# Patient Record
Sex: FEMALE | Race: WHITE
[De-identification: names, ages, dates, MRNs, and addresses within clinical notes are randomized per-mention and may not be internally consistent; named-entity substitution may affect disease eponyms.]

---

## 2018-06-11 ENCOUNTER — HOSPITAL ENCOUNTER (OUTPATIENT)
Dept: HOSPITAL 17 - NEPC | Age: 73
Setting detail: OBSERVATION
LOS: 2 days | Discharge: HOME | End: 2018-06-13
Attending: HOSPITALIST | Admitting: HOSPITALIST
Payer: COMMERCIAL

## 2018-06-11 VITALS
SYSTOLIC BLOOD PRESSURE: 176 MMHG | RESPIRATION RATE: 12 BRPM | HEART RATE: 83 BPM | TEMPERATURE: 98.5 F | OXYGEN SATURATION: 95 % | DIASTOLIC BLOOD PRESSURE: 126 MMHG

## 2018-06-11 VITALS
RESPIRATION RATE: 15 BRPM | SYSTOLIC BLOOD PRESSURE: 186 MMHG | DIASTOLIC BLOOD PRESSURE: 77 MMHG | OXYGEN SATURATION: 94 % | HEART RATE: 85 BPM

## 2018-06-11 VITALS — WEIGHT: 154.32 LBS | BODY MASS INDEX: 28.4 KG/M2 | HEIGHT: 62 IN

## 2018-06-11 DIAGNOSIS — E11.39: ICD-10-CM

## 2018-06-11 DIAGNOSIS — I69.354: ICD-10-CM

## 2018-06-11 DIAGNOSIS — I25.10: ICD-10-CM

## 2018-06-11 DIAGNOSIS — E03.9: ICD-10-CM

## 2018-06-11 DIAGNOSIS — F32.9: ICD-10-CM

## 2018-06-11 DIAGNOSIS — H53.2: ICD-10-CM

## 2018-06-11 DIAGNOSIS — G83.9: ICD-10-CM

## 2018-06-11 DIAGNOSIS — H02.401: ICD-10-CM

## 2018-06-11 DIAGNOSIS — E78.00: ICD-10-CM

## 2018-06-11 DIAGNOSIS — I25.2: ICD-10-CM

## 2018-06-11 DIAGNOSIS — E78.5: ICD-10-CM

## 2018-06-11 DIAGNOSIS — Z79.82: ICD-10-CM

## 2018-06-11 DIAGNOSIS — Z79.4: ICD-10-CM

## 2018-06-11 DIAGNOSIS — H49.00: Primary | ICD-10-CM

## 2018-06-11 DIAGNOSIS — I10: ICD-10-CM

## 2018-06-11 DIAGNOSIS — G51.0: ICD-10-CM

## 2018-06-11 DIAGNOSIS — E11.42: ICD-10-CM

## 2018-06-11 DIAGNOSIS — R53.1: ICD-10-CM

## 2018-06-11 LAB
ALBUMIN SERPL-MCNC: 3.8 GM/DL (ref 3.4–5)
ALP SERPL-CCNC: 173 U/L (ref 45–117)
ALT SERPL-CCNC: 18 U/L (ref 10–53)
AST SERPL-CCNC: 19 U/L (ref 15–37)
BASOPHILS # BLD AUTO: 0 TH/MM3 (ref 0–0.2)
BASOPHILS NFR BLD: 0.3 % (ref 0–2)
BILIRUB SERPL-MCNC: 0.3 MG/DL (ref 0.2–1)
BUN SERPL-MCNC: 11 MG/DL (ref 7–18)
CALCIUM SERPL-MCNC: 9.2 MG/DL (ref 8.5–10.1)
CHLORIDE SERPL-SCNC: 100 MEQ/L (ref 98–107)
COLOR UR: (no result)
CREAT SERPL-MCNC: 0.74 MG/DL (ref 0.5–1)
CRP SERPL-MCNC: (no result) MG/DL (ref 0–0.3)
EOSINOPHIL # BLD: 0.2 TH/MM3 (ref 0–0.4)
EOSINOPHIL NFR BLD: 2.4 % (ref 0–4)
ERYTHROCYTE [DISTWIDTH] IN BLOOD BY AUTOMATED COUNT: 12.9 % (ref 11.6–17.2)
GFR SERPLBLD BASED ON 1.73 SQ M-ARVRAT: 78 ML/MIN (ref 89–?)
GLUCOSE SERPL-MCNC: 167 MG/DL (ref 74–106)
GLUCOSE UR STRIP-MCNC: (no result) MG/DL
HCO3 BLD-SCNC: 33.7 MEQ/L (ref 21–32)
HCT VFR BLD CALC: 45.8 % (ref 35–46)
HGB BLD-MCNC: 15.5 GM/DL (ref 11.6–15.3)
HGB UR QL STRIP: (no result)
INR PPP: 0.9 RATIO
KETONES UR STRIP-MCNC: (no result) MG/DL
LYMPHOCYTES # BLD AUTO: 1.7 TH/MM3 (ref 1–4.8)
LYMPHOCYTES NFR BLD AUTO: 24.2 % (ref 9–44)
MCH RBC QN AUTO: 31.9 PG (ref 27–34)
MCHC RBC AUTO-ENTMCNC: 33.9 % (ref 32–36)
MCV RBC AUTO: 94.2 FL (ref 80–100)
MONOCYTE #: 0.6 TH/MM3 (ref 0–0.9)
MONOCYTES NFR BLD: 7.8 % (ref 0–8)
NEUTROPHILS # BLD AUTO: 4.7 TH/MM3 (ref 1.8–7.7)
NEUTROPHILS NFR BLD AUTO: 65.3 % (ref 16–70)
NITRITE UR QL STRIP: (no result)
PLATELET # BLD: 303 TH/MM3 (ref 150–450)
PMV BLD AUTO: 7.8 FL (ref 7–11)
PROT SERPL-MCNC: 7.6 GM/DL (ref 6.4–8.2)
PROTHROMBIN TIME: 9.3 SEC (ref 9.8–11.6)
RBC # BLD AUTO: 4.86 MIL/MM3 (ref 4–5.3)
SODIUM SERPL-SCNC: 141 MEQ/L (ref 136–145)
SP GR UR STRIP: 1 (ref 1–1.03)
URINE LEUKOCYTE ESTERASE: (no result)
WBC # BLD AUTO: 7.2 TH/MM3 (ref 4–11)

## 2018-06-11 PROCEDURE — 82550 ASSAY OF CK (CPK): CPT

## 2018-06-11 PROCEDURE — 70548 MR ANGIOGRAPHY NECK W/DYE: CPT

## 2018-06-11 PROCEDURE — 96361 HYDRATE IV INFUSION ADD-ON: CPT

## 2018-06-11 PROCEDURE — 96374 THER/PROPH/DIAG INJ IV PUSH: CPT

## 2018-06-11 PROCEDURE — 85610 PROTHROMBIN TIME: CPT

## 2018-06-11 PROCEDURE — 96376 TX/PRO/DX INJ SAME DRUG ADON: CPT

## 2018-06-11 PROCEDURE — P9612 CATHETERIZE FOR URINE SPEC: HCPCS

## 2018-06-11 PROCEDURE — G0378 HOSPITAL OBSERVATION PER HR: HCPCS

## 2018-06-11 PROCEDURE — 99285 EMERGENCY DEPT VISIT HI MDM: CPT

## 2018-06-11 PROCEDURE — 82948 REAGENT STRIP/BLOOD GLUCOSE: CPT

## 2018-06-11 PROCEDURE — 85730 THROMBOPLASTIN TIME PARTIAL: CPT

## 2018-06-11 PROCEDURE — 81001 URINALYSIS AUTO W/SCOPE: CPT

## 2018-06-11 PROCEDURE — 93005 ELECTROCARDIOGRAM TRACING: CPT

## 2018-06-11 PROCEDURE — 70544 MR ANGIOGRAPHY HEAD W/O DYE: CPT

## 2018-06-11 PROCEDURE — 70551 MRI BRAIN STEM W/O DYE: CPT

## 2018-06-11 PROCEDURE — 80048 BASIC METABOLIC PNL TOTAL CA: CPT

## 2018-06-11 PROCEDURE — 80053 COMPREHEN METABOLIC PANEL: CPT

## 2018-06-11 PROCEDURE — 83036 HEMOGLOBIN GLYCOSYLATED A1C: CPT

## 2018-06-11 PROCEDURE — A9579 GAD-BASE MR CONTRAST NOS,1ML: HCPCS

## 2018-06-11 PROCEDURE — 86140 C-REACTIVE PROTEIN: CPT

## 2018-06-11 PROCEDURE — 82607 VITAMIN B-12: CPT

## 2018-06-11 PROCEDURE — 85025 COMPLETE CBC W/AUTO DIFF WBC: CPT

## 2018-06-11 PROCEDURE — 84443 ASSAY THYROID STIM HORMONE: CPT

## 2018-06-11 PROCEDURE — 97162 PT EVAL MOD COMPLEX 30 MIN: CPT

## 2018-06-11 PROCEDURE — 85652 RBC SED RATE AUTOMATED: CPT

## 2018-06-11 PROCEDURE — 96372 THER/PROPH/DIAG INJ SC/IM: CPT

## 2018-06-11 PROCEDURE — 97166 OT EVAL MOD COMPLEX 45 MIN: CPT

## 2018-06-11 NOTE — EKG
Date Performed: 06/11/2018       Time Performed: 16:48:37

 

PTAGE:      72 years

 

EKG:      Sinus rhythm 

 

 NORMAL ECG Compared to prior electrocardiogram, Premature ventricular contractions no longer present
. . 

 

 PREVIOUS TRACING            : 01/18/2001 10.40

 

DOCTOR:   Jorge Luis Telles  Interpretating Date/Time  06/11/2018 21:45:20

## 2018-06-11 NOTE — RADRPT
EXAM DATE:  6/11/2018 7:48 PM EDT

AGE/SEX:        72 years / Female



INDICATIONS:  Stroke.



CLINICAL DATA:  This is the patient's initial encounter. Patient reports that signs and symptoms have
 been present for 1 day and indicates a pain score of 2/10. 

                                                                          

MEDICAL/SURGICAL HISTORY:       Diabetes mellitus type II. Appendectomy.  Hysterectomy. Bilateral kne
e sx.



[place]      [facility] [place]2018.  . 





TECHNIQUE:     3D time-of-flight MRA was performed.  Source images, multiplanar STS MIP, and 3D volum
e MIP reconstructions were reviewed.



FINDINGS:     

There is excellent visualization of the major intracranial arteries out to the second-order branch ve
ssels.  There is no evidence for aneurysm, vessel truncation or stenosis, and no evidence for vascula
r malformation.



CONCLUSION: 

1.  Negative MRA Cow ([place]) non contrast.





Electronically signed by: DISHA Sands MD  6/11/2018 10:22 PM EDT

## 2018-06-11 NOTE — RADRPT
EXAM DATE:  6/11/2018 7:52 PM EDT

AGE/SEX:        72 years / Female



INDICATIONS:  Stroke. Right sided facial droop.



CLINICAL DATA:  This is the patient's initial encounter. Patient reports that signs and symptoms have
 been present for 1 week and indicates a pain score of 4/10. 

                                                                          

MEDICAL/SURGICAL HISTORY:       Diabetes mellitus type II.  Hypertension. Appendectomy.  Hysterectomy
. Bilat knees.



COMPARISON:      No prior exams available for comparison. 





TECHNIQUE:   20 ml Omniscan (gadodiamide) contrast infused MRA (single exam dose) of the extracranial
 circulation was performed using a neurovascular coil.  Postprocessing was performed, including rotat
ing sub-volume maximum intensity projections of each carotid artery, rotating full-volume maximum int
ensity projections of both carotid arteries, sagittal and coronal sliding thin-slab reformations of e
ach carotid artery, and left oblique sliding thin-slab reformation through the aortic arch to include
 the origin of the arch branch vessels.







FINDINGS:  

The examination is slightly limited due to motion artifact particularly evaluating aortic arch, howev
er major vessels appear grossly patent. The vertebral arteries are intact and there is no evidence fo
r any significant stenosis involving the internal carotid arteries on either side.



CONCLUSION: 

1.  Unremarkable study.



_____________________________________________________________________________

Percent stenosis is calculated using the diameter of the stenotic region over the diameter of the nor
mal distal internal carotid artery

_____________________________________________________________________________





Electronically signed by: DISHA Sands MD  6/11/2018 8:11 PM EDT

## 2018-06-11 NOTE — PD
HPI


Chief Complaint:  Neuro Symptoms/ Deficits


Time Seen by Provider:  17:25


Travel History


International Travel<30 days:  No


Contact w/Intl Traveler<30days:  No


Traveled to known affect area:  No





History of Present Illness


HPI


72y female with a history of CVA with residual left-sided weakness, diabetes 

mellitus, hypertension, presents emergency department at the request of her 

primary care physician for evaluation of right sided facial drooping and pain 

that started 4 days ago.  Patient was evaluated yesterday and [facility] 

ER where she had a CT performed and discharged home to follow-up with a primary 

care physician. She saw the ophthalmologist yesterday and they recommended she 

come to the ER for evaluation. Patient says she takes a daily aspirin but 

denies any other blood thinner use.  Patient previously was evaluated by 

neurology several years ago but has not followed up since then.  She says that 

she has had multiple CVAs affecting her left side but never on the right.  Says 

that she is having double vision associated with the weakness.  She denies 

chest pain or shortness of breath.  She denies abdominal pain. She denies 

unusual muscular weakness.





PFSH


Past Medical History


Depression:  Yes


High Cholesterol:  Yes


Cerebrovascular Accident:  Yes (L SIDE EFFECTED RESIDUAL)


Diabetes:  Yes


Patient Takes Glucophage:  No


Headaches:  Yes


Hypertension:  Yes


Myocardial Infarction:  Yes (multiple MI, stents placed)


Thyroid Disease:  Yes (HYPER)





Past Surgical History


Appendectomy:  Yes


Hysterectomy:  Yes


Tonsillectomy:  Yes





Social History


Alcohol Use:  No


Tobacco Use:  No


Substance Use:  No





Allergies-Medications


(Allergen,Severity, Reaction):  


Coded Allergies:  


     codeine (Unverified  Allergy, Severe, RASH, 6/11/18)


Reported Meds & Prescriptions





Reported Meds & Active Scripts


Active


Reported


Lasix (Furosemide) 40 Mg Tab 40 Mg PO DAILY


Levothyroxine (Levothyroxine Sodium) 137 Mcg Tab 137 Mcg PO DAILY


Baclofen 20 Mg Tab 20 Mg PO TID


Aspirin 325 Mg Tab 325 Mg PO DAILY


Gabapentin 300 Mg Cap 300 Mg PO TID


Metoprolol Tartrate 25 Mg Tab 25 Mg PO BID


Omeprazole 20 Mg Tab 20 Mg PO DAILY


Novolin R Inj (Insulin Human Regular) 1,000 Unit/10 Ml Vial 0 SQ AS DIRECTED


     Sliding Scale As Directed.


Dyrenium (Triamterene) 50 Mg Cap 50 Mg PO DAILY


Hydrochlorothiazide 50 Mg Tab 75 Mg PO DAILY


Simvastatin 40 Mg Tab 40 Mg PO HS


Klor-Con 10 (Potassium Chloride) 10 Meq Tab 10 Meq PO BID


Effexor (Venlafaxine HCl) 75 Mg Tab 150 Mg PO Q12H


Levemir Flextouch Pen Inj (Insulin Detemir) 300 unit/3 ML Pen 60 Units SQ BID








Review of Systems


Except as stated in HPI:  all other systems reviewed are Neg





Physical Exam


Narrative


GENERAL: WD, WN in NAD


SKIN: Focused skin assessment warm/dry.


HEAD: Atraumatic. Normocephalic. 


EYES: Pupils equal and round. No scleral icterus. No injection or drainage. 


ENT: No nasal bleeding or discharge.  Mucous membranes pink and moist.


NECK: Trachea midline. No JVD. 


CARDIOVASCULAR: Regular rate and rhythm.  No murmur appreciated.


RESPIRATORY: No accessory muscle use. Clear to auscultation. Breath sounds 

equal bilaterally. 


GASTROINTESTINAL: Abdomen soft, non-tender, nondistended. Hepatic and splenic 

margins not palpable. 


MUSCULOSKELETAL: No obvious deformities. No clubbing.  No cyanosis.  No edema. 


NEUROLOGICAL: Awake and alert. 


Ptosis with lateral lower gaze of R eye, right sided drooping of mouth, 


Able to raise eyebrows bilaterally and close eyes with resistance.


L sided pronator drift (chronic, per patient), Grade 5/5 upper and lower 

extremities.


PSYCHIATRIC: Appropriate mood and affect; insight and judgment normal.





Data


Data


Last Documented VS





Vital Signs








  Date Time  Temp Pulse Resp B/P (MAP) Pulse Ox O2 Delivery O2 Flow Rate FiO2


 


6/11/18 18:10  85 15 186/77 (113) 94 Room Air  


 


6/11/18 17:21 98.5       








Orders





 Orders


Mri Brain W/O Contrast (6/11/18 )


Mra Brain W/O Contrast (Cow) (6/11/18 )


Mra Carotids W Contrast (6/11/18 )


Electrocardiogram (6/11/18 )


Prothrombin Time / Inr (Pt) (6/11/18 17:25)


Act Partial Throm Time (Ptt) (6/11/18 17:25)


Complete Blood Count With Diff (6/11/18 17:25)


Ua Includes Microscopic (6/11/18 17:25)


Blood Glucose (6/11/18 17:25)


Ecg Monitoring (6/11/18 17:25)


Iv Access Insert/Monitor (6/11/18 17:25)


NPO (6/11/18 17:25)


Oximetry (6/11/18 17:25)


Sodium Chlor 0.9% 1000 Ml Inj (Ns 1000 M (6/11/18 17:25)


Cath For Specimen (6/11/18 17:25)


^ Straight Catheter (6/11/18 17:29)


Comprehensive Metabolic Panel (6/11/18 17:30)


W[place]gren Sedimentation Rate (6/11/18 17:30)


C-Reactive Protein (Crp) (6/11/18 17:30)


Creatine Kinase (Cpk) (6/11/18 17:30)


Gadodiamide Pf Inj (Omniscan Pf Inj) (6/11/18 17:00)


Acetaminophen (Tylenol) (6/11/18 23:15)


Admit Order (Ed Use Only) (6/11/18 23:15)





Labs





Laboratory Tests








Test


  6/11/18


17:37 6/11/18


17:40


 


White Blood Count 7.2 TH/MM3  


 


Red Blood Count 4.86 MIL/MM3  


 


Hemoglobin 15.5 GM/DL  


 


Hematocrit 45.8 %  


 


Mean Corpuscular Volume 94.2 FL  


 


Mean Corpuscular Hemoglobin 31.9 PG  


 


Mean Corpuscular Hemoglobin


Concent 33.9 % 


  


 


 


Red Cell Distribution Width 12.9 %  


 


Platelet Count 303 TH/MM3  


 


Mean Platelet Volume 7.8 FL  


 


Neutrophils (%) (Auto) 65.3 %  


 


Lymphocytes (%) (Auto) 24.2 %  


 


Monocytes (%) (Auto) 7.8 %  


 


Eosinophils (%) (Auto) 2.4 %  


 


Basophils (%) (Auto) 0.3 %  


 


Neutrophils # (Auto) 4.7 TH/MM3  


 


Lymphocytes # (Auto) 1.7 TH/MM3  


 


Monocytes # (Auto) 0.6 TH/MM3  


 


Eosinophils # (Auto) 0.2 TH/MM3  


 


Basophils # (Auto) 0.0 TH/MM3  


 


CBC Comment DIFF FINAL  


 


Differential Comment   


 


Erythrocyte Sedimentation Rate 17 mm/hr  


 


Prothrombin Time 9.3 SEC  


 


Prothromb Time International


Ratio 0.9 RATIO 


  


 


 


Activated Partial


Thromboplast Time 22.1 SEC 


  


 


 


Blood Urea Nitrogen 11 MG/DL  


 


Creatinine 0.74 MG/DL  


 


Random Glucose 167 MG/DL  


 


Total Protein 7.6 GM/DL  


 


Albumin 3.8 GM/DL  


 


Calcium Level 9.2 MG/DL  


 


Alkaline Phosphatase 173 U/L  


 


Aspartate Amino Transf


(AST/SGOT) 19 U/L 


  


 


 


Alanine Aminotransferase


(ALT/SGPT) 18 U/L 


  


 


 


Total Bilirubin 0.3 MG/DL  


 


Sodium Level 141 MEQ/L  


 


Potassium Level 4.0 MEQ/L  


 


Chloride Level 100 MEQ/L  


 


Carbon Dioxide Level 33.7 MEQ/L  


 


Anion Gap 7 MEQ/L  


 


Estimat Glomerular Filtration


Rate 78 ML/MIN 


  


 


 


Total Creatine Kinase 85 U/L  


 


C-Reactive Protein


  LESS THAN 0.29


MG/DL 


 


 


Urine Color  LIGHT-YELLOW 


 


Urine Turbidity  CLEAR 


 


Urine pH  7.0 


 


Urine Specific Gravity  1.003 


 


Urine Protein  NEG mg/dL 


 


Urine Glucose (UA)  NEG mg/dL 


 


Urine Ketones  NEG mg/dL 


 


Urine Occult Blood  NEG 


 


Urine Nitrite  NEG 


 


Urine Bilirubin  NEG 


 


Urine Urobilinogen


  


  LESS THAN 2.0


MG/DL


 


Urine Leukocyte Esterase  NEG 


 


Urine WBC


  


  LESS THAN 1


/hpf


 


Microscopic Urinalysis Comment  CATH 











MDM


Medical Decision Making


Medical Screen Exam Complete:  Yes


Emergency Medical Condition:  Yes


Differential Diagnosis


TIA, CVA, ICH, nerve palsy, diplopia


Narrative Course


72-year-old female with history of multiple CVAs with residual left-sided 

weakness presents emergency department for evaluation of right facial drooping 

that started approximately 4 days ago.  Patient was seen at [facility], CT 

was performed and found to be without acute process.  She was advised to follow-

up an ophthalmologist and she saw an ophthalmologist yesterday evening who 

recommended she come back to the emergency department for further evaluation 

and workup.


Vital signs are stable.


Labs and imaging studies ordered.


I consulted my attending regarding this case. He recommended MRI, MRA brain and 

carotids.





After review the EMR, it appears that patient went to [facility] [place] and 

emergency department for evaluation of her symptoms.  There is no acute process 

of the CT brain or maxillofacial imaging studies.  There was a suspicion of 

some sort of nerve palsy causing her symptoms.  Her inflammatory markers were 

negative at that time.





CBC & BMP Diagram


6/11/18 17:37








Total Protein 7.6, Albumin 3.8, Calcium Level 9.2, Alkaline Phosphatase 173 H, 

Aspartate Amino Transf (AST/SGOT) 19, Alanine Aminotransferase (ALT/SGPT) 18, 

Total Bilirubin 0.3


CK 85, CRP 0.29, ESR 17, urine clear.





It does not appear that she has had a neuro work up since the onset of these 

symptoms. My attending recommended admission for further evaluation because of 

her worsening symptoms. 


Pt will be admitted for neuro consult.





Diagnosis





 Primary Impression:  


 Facial palsy





Admitting Information


Admitting Physician Requests:  Observation


Condition:  Stable











Harmony Stewart Jun 11, 2018 17:37

## 2018-06-11 NOTE — RADRPT
EXAM DATE:  6/11/2018 6:56 PM EDT

AGE/SEX:        72 years / Female



INDICATIONS:    CVA.  Right sided facial droop.



CLINICAL DATA:  This is the patient's initial encounter. Patient reports that signs and symptoms have
 been present for 1 week and indicates a pain score of 4/10. 

                                                                          

MEDICAL/SURGICAL HISTORY:       Diabetes mellitus type II.  Hypertension. Appendectomy.  Hysterectomy
. Bilat knees.



COMPARISON:      [facility], CT BRAIN W/O CONTRAST, 6/10/2018.  . 





TECHNIQUE: Multiplanar, multisequence examination of the brain was performed without contrast.



FINDINGS:  

There is no evidence for intracranial hemorrhage, mass effect, mass lesions, edema, or extra-axial fl
uid collections.  The ventricles are normal size for the patient's age.  There are no signs of acute 
infarction for technique.  The diffusion portion is unremarkable. 



CONCLUSION:  Unremarkable study.  



Electronically signed by: DISHA Sands MD  6/11/2018 6:59 PM EDT

## 2018-06-12 VITALS
TEMPERATURE: 98 F | HEART RATE: 65 BPM | OXYGEN SATURATION: 94 % | DIASTOLIC BLOOD PRESSURE: 62 MMHG | RESPIRATION RATE: 18 BRPM | SYSTOLIC BLOOD PRESSURE: 137 MMHG

## 2018-06-12 VITALS
OXYGEN SATURATION: 98 % | SYSTOLIC BLOOD PRESSURE: 136 MMHG | RESPIRATION RATE: 16 BRPM | HEART RATE: 75 BPM | DIASTOLIC BLOOD PRESSURE: 60 MMHG

## 2018-06-12 VITALS
TEMPERATURE: 98.7 F | RESPIRATION RATE: 16 BRPM | HEART RATE: 82 BPM | SYSTOLIC BLOOD PRESSURE: 146 MMHG | DIASTOLIC BLOOD PRESSURE: 65 MMHG | OXYGEN SATURATION: 92 %

## 2018-06-12 VITALS
HEART RATE: 69 BPM | DIASTOLIC BLOOD PRESSURE: 80 MMHG | SYSTOLIC BLOOD PRESSURE: 118 MMHG | OXYGEN SATURATION: 96 % | RESPIRATION RATE: 18 BRPM | TEMPERATURE: 98 F

## 2018-06-12 VITALS
DIASTOLIC BLOOD PRESSURE: 68 MMHG | TEMPERATURE: 97.9 F | OXYGEN SATURATION: 96 % | RESPIRATION RATE: 17 BRPM | HEART RATE: 72 BPM | SYSTOLIC BLOOD PRESSURE: 146 MMHG

## 2018-06-12 VITALS
RESPIRATION RATE: 16 BRPM | OXYGEN SATURATION: 98 % | HEART RATE: 77 BPM | TEMPERATURE: 98.6 F | SYSTOLIC BLOOD PRESSURE: 150 MMHG | DIASTOLIC BLOOD PRESSURE: 65 MMHG

## 2018-06-12 VITALS
TEMPERATURE: 98.5 F | HEART RATE: 72 BPM | DIASTOLIC BLOOD PRESSURE: 63 MMHG | OXYGEN SATURATION: 94 % | RESPIRATION RATE: 15 BRPM | SYSTOLIC BLOOD PRESSURE: 126 MMHG

## 2018-06-12 VITALS
OXYGEN SATURATION: 96 % | DIASTOLIC BLOOD PRESSURE: 67 MMHG | TEMPERATURE: 98.2 F | RESPIRATION RATE: 20 BRPM | SYSTOLIC BLOOD PRESSURE: 154 MMHG | HEART RATE: 69 BPM

## 2018-06-12 VITALS — HEART RATE: 80 BPM

## 2018-06-12 VITALS — HEART RATE: 64 BPM

## 2018-06-12 VITALS — HEART RATE: 70 BPM

## 2018-06-12 VITALS — HEART RATE: 73 BPM

## 2018-06-12 LAB
BASOPHILS # BLD AUTO: 0 TH/MM3 (ref 0–0.2)
BASOPHILS NFR BLD: 0.4 % (ref 0–2)
BUN SERPL-MCNC: 9 MG/DL (ref 7–18)
CALCIUM SERPL-MCNC: 8.7 MG/DL (ref 8.5–10.1)
CHLORIDE SERPL-SCNC: 101 MEQ/L (ref 98–107)
CREAT SERPL-MCNC: 0.58 MG/DL (ref 0.5–1)
CRP SERPL-MCNC: (no result) MG/DL (ref 0–0.3)
EOSINOPHIL # BLD: 0.3 TH/MM3 (ref 0–0.4)
EOSINOPHIL NFR BLD: 5.6 % (ref 0–4)
ERYTHROCYTE [DISTWIDTH] IN BLOOD BY AUTOMATED COUNT: 12.9 % (ref 11.6–17.2)
GFR SERPLBLD BASED ON 1.73 SQ M-ARVRAT: 103 ML/MIN (ref 89–?)
GLUCOSE SERPL-MCNC: 207 MG/DL (ref 74–106)
HBA1C MFR BLD: 11.8 % (ref 4.3–6)
HCO3 BLD-SCNC: 32.1 MEQ/L (ref 21–32)
HCT VFR BLD CALC: 40 % (ref 35–46)
HGB BLD-MCNC: 13.6 GM/DL (ref 11.6–15.3)
LYMPHOCYTES # BLD AUTO: 2.4 TH/MM3 (ref 1–4.8)
LYMPHOCYTES NFR BLD AUTO: 43.6 % (ref 9–44)
MCH RBC QN AUTO: 31.7 PG (ref 27–34)
MCHC RBC AUTO-ENTMCNC: 33.9 % (ref 32–36)
MCV RBC AUTO: 93.5 FL (ref 80–100)
MONOCYTE #: 0.5 TH/MM3 (ref 0–0.9)
MONOCYTES NFR BLD: 8.3 % (ref 0–8)
NEUTROPHILS # BLD AUTO: 2.3 TH/MM3 (ref 1.8–7.7)
NEUTROPHILS NFR BLD AUTO: 42.1 % (ref 16–70)
PLATELET # BLD: 247 TH/MM3 (ref 150–450)
PMV BLD AUTO: 7.7 FL (ref 7–11)
RBC # BLD AUTO: 4.28 MIL/MM3 (ref 4–5.3)
SODIUM SERPL-SCNC: 141 MEQ/L (ref 136–145)
VIT B12 SERPL-MCNC: 383 PG/ML (ref 193–986)
WBC # BLD AUTO: 5.5 TH/MM3 (ref 4–11)

## 2018-06-12 RX ADMIN — FUROSEMIDE SCH MG: 40 TABLET ORAL at 08:34

## 2018-06-12 RX ADMIN — LEVOTHYROXINE SODIUM SCH MCG: 112 TABLET ORAL at 06:28

## 2018-06-12 RX ADMIN — HYDROCODONE BITARTRATE AND ACETAMINOPHEN PRN TAB: 10; 325 TABLET ORAL at 22:19

## 2018-06-12 RX ADMIN — PANTOPRAZOLE SODIUM SCH MG: 20 TABLET, DELAYED RELEASE ORAL at 08:36

## 2018-06-12 RX ADMIN — INSULIN ASPART SCH: 100 INJECTION, SOLUTION INTRAVENOUS; SUBCUTANEOUS at 13:25

## 2018-06-12 RX ADMIN — HYDROCHLOROTHIAZIDE SCH MG: 50 TABLET ORAL at 08:34

## 2018-06-12 RX ADMIN — METOPROLOL TARTRATE SCH MG: 25 TABLET, FILM COATED ORAL at 08:36

## 2018-06-12 RX ADMIN — GABAPENTIN SCH MG: 300 CAPSULE ORAL at 08:36

## 2018-06-12 RX ADMIN — METOPROLOL TARTRATE SCH MG: 25 TABLET, FILM COATED ORAL at 21:00

## 2018-06-12 RX ADMIN — GABAPENTIN SCH MG: 300 CAPSULE ORAL at 18:29

## 2018-06-12 RX ADMIN — INSULIN ASPART SCH: 100 INJECTION, SOLUTION INTRAVENOUS; SUBCUTANEOUS at 22:21

## 2018-06-12 RX ADMIN — HYDROCODONE BITARTRATE AND ACETAMINOPHEN PRN TAB: 10; 325 TABLET ORAL at 16:34

## 2018-06-12 RX ADMIN — ENOXAPARIN SODIUM SCH MG: 40 INJECTION SUBCUTANEOUS at 00:16

## 2018-06-12 RX ADMIN — LEVOTHYROXINE SODIUM SCH MCG: 25 TABLET ORAL at 06:28

## 2018-06-12 RX ADMIN — ASPIRIN SCH MG: 325 TABLET ORAL at 08:34

## 2018-06-12 RX ADMIN — ACYCLOVIR SCH UNITS: 800 TABLET ORAL at 22:20

## 2018-06-12 RX ADMIN — Medication SCH ML: at 08:37

## 2018-06-12 RX ADMIN — ACYCLOVIR SCH UNITS: 800 TABLET ORAL at 11:07

## 2018-06-12 RX ADMIN — Medication SCH ML: at 22:17

## 2018-06-12 RX ADMIN — INSULIN ASPART SCH: 100 INJECTION, SOLUTION INTRAVENOUS; SUBCUTANEOUS at 18:29

## 2018-06-12 RX ADMIN — INSULIN ASPART SCH: 100 INJECTION, SOLUTION INTRAVENOUS; SUBCUTANEOUS at 11:06

## 2018-06-12 RX ADMIN — VENLAFAXINE HYDROCHLORIDE SCH MG: 75 CAPSULE, EXTENDED RELEASE ORAL at 08:34

## 2018-06-12 RX ADMIN — GABAPENTIN SCH MG: 300 CAPSULE ORAL at 13:25

## 2018-06-12 NOTE — HHI.HP
__________________________________________________





HPI


Service


[facility]


Primary Care Physician


Salvador Manning MD


Admission Diagnosis





R facial drooping x 4 days, worsening last night, r/o CVA


Diagnoses:  


Travel History


International Travel<30 Days:  No


Contact w/Intl Traveler <30 Da:  No


Traveled to Known Affected Are:  No


History of Present Illness


72-year-old female with a past medical history significant for diabetes mellitus

, coronary artery disease, Graves' disease, hypertension, hyperlipidemia, 

neuropathy and history of CVA 5 presents the emergency department for 

evaluation of double vision and right sided ptosis.  The patient reports that 

for the past 4 days she has had double vision.  She states for the past 2 days 

she has had right eyelid drooping with right-sided facial paralysis.  The 

patient was seen in [place] yesterday where she was diagnosed with a facial 

palsy.  Patient denies any chest pain or shortness of breath.  No abdominal 

pain.  No nausea/vomiting/diarrhea.  No fevers/chills.





Review of Systems


Except as stated in HPI:  all other systems reviewed are Neg





Past Family Social History


Past Medical History


diabetes mellitus, coronary artery disease, Graves' disease, hypertension, 

hyperlipidemia, neuropathy and history of CVA 5


Past Surgical History


Cardiac catheterization with stent placement 1


Hysterectomy


Back tumor removal


Appendectomy


Tonsillectomy


Bilateral knee arthroscopically


Right rotator cuff repair


Reported Medications





Reported Meds & Active Scripts


Active


Reported


Lasix (Furosemide) 40 Mg Tab 40 Mg PO DAILY


Levothyroxine (Levothyroxine Sodium) 137 Mcg Tab 137 Mcg PO DAILY


Baclofen 20 Mg Tab 20 Mg PO TID


Aspirin 325 Mg Tab 325 Mg PO DAILY


Gabapentin 300 Mg Cap 300 Mg PO TID


Metoprolol Tartrate 25 Mg Tab 25 Mg PO BID


Omeprazole 20 Mg Tab 20 Mg PO DAILY


Novolin R Inj (Insulin Human Regular) 1,000 Unit/10 Ml Vial 0 SQ AS DIRECTED


     Sliding Scale As Directed.


Dyrenium (Triamterene) 50 Mg Cap 50 Mg PO DAILY


Hydrochlorothiazide 50 Mg Tab 75 Mg PO DAILY


Simvastatin 40 Mg Tab 40 Mg PO HS


Klor-Con 10 (Potassium Chloride) 10 Meq Tab 10 Meq PO BID


Effexor (Venlafaxine HCl) 75 Mg Tab 150 Mg PO Q12H


Levemir Flextouch Pen Inj (Insulin Detemir) 300 unit/3 ML Pen 60 Units SQ BID


Allergies:  


Coded Allergies:  


     codeine (Unverified  Allergy, Severe, RASH, 18)


Family History


Mother with diabetes mellitus.  Father with coronary artery disease.


Social History


Negative for alcohol, tobacco and illicit drugs





Physical Exam


Vital Signs





Vital Signs








  Date Time  Temp Pulse Resp B/P (MAP) Pulse Ox O2 Delivery O2 Flow Rate FiO2


 


18 18:10  85 15 186/77 (113) 94 Room Air  


 


18 18:03     (143)  Room Air  


 


18 17:21 98.5 83 12 176/126 (143) 95 Room Air  








Physical Exam


GENERAL:  female sitting up in bed


SKIN: No rashes, ecchymoses or lesions. Cool and dry.


HEAD: Atraumatic. Normocephalic. No temporal or scalp tenderness.


EYES: Pupils equal round and reactive. Extraocular motions intact. No scleral 

icterus. No injection or drainage. 


ENT: Nose without bleeding, purulent drainage or septal hematoma. Throat 

without erythema, tonsillar hypertrophy or exudate. Uvula midline. Airway 

patent.


NECK: Trachea midline. No JVD or lymphadenopathy. Supple, nontender, no 

meningeal signs.


CARDIOVASCULAR: Regular rate and rhythm without murmurs, gallops, or rubs. 


RESPIRATORY: Clear to auscultation. Breath sounds equal bilaterally. No wheezes

, rales, or rhonchi.  


GASTROINTESTINAL: Abdomen soft, non-tender, nondistended. No hepato-splenomegaly

, or palpable masses. No guarding.


MUSCULOSKELETAL: Extremities without clubbing, cyanosis, or edema. No joint 

tenderness, effusion, or edema noted. No calf tenderness. 


NEUROLOGICAL: Awake and alert.  Right-sided ptosis with inability to raise the 

right eyebrow.  Remainder of cranial nerve exam within normal limits.


Laboratory





Laboratory Tests








Test


  18


17:37 18


17:40


 


White Blood Count 7.2  


 


Red Blood Count 4.86  


 


Hemoglobin 15.5  


 


Hematocrit 45.8  


 


Mean Corpuscular Volume 94.2  


 


Mean Corpuscular Hemoglobin 31.9  


 


Mean Corpuscular Hemoglobin


Concent 33.9 


  


 


 


Red Cell Distribution Width 12.9  


 


Platelet Count 303  


 


Mean Platelet Volume 7.8  


 


Neutrophils (%) (Auto) 65.3  


 


Lymphocytes (%) (Auto) 24.2  


 


Monocytes (%) (Auto) 7.8  


 


Eosinophils (%) (Auto) 2.4  


 


Basophils (%) (Auto) 0.3  


 


Neutrophils # (Auto) 4.7  


 


Lymphocytes # (Auto) 1.7  


 


Monocytes # (Auto) 0.6  


 


Eosinophils # (Auto) 0.2  


 


Basophils # (Auto) 0.0  


 


CBC Comment DIFF FINAL  


 


Differential Comment   


 


Erythrocyte Sedimentation Rate 17  


 


Prothrombin Time 9.3  


 


Prothromb Time International


Ratio 0.9 


  


 


 


Activated Partial


Thromboplast Time 22.1 


  


 


 


Blood Urea Nitrogen 11  


 


Creatinine 0.74  


 


Random Glucose 167  


 


Total Protein 7.6  


 


Albumin 3.8  


 


Calcium Level 9.2  


 


Alkaline Phosphatase 173  


 


Aspartate Amino Transf


(AST/SGOT) 19 


  


 


 


Alanine Aminotransferase


(ALT/SGPT) 18 


  


 


 


Total Bilirubin 0.3  


 


Sodium Level 141  


 


Potassium Level 4.0  


 


Chloride Level 100  


 


Carbon Dioxide Level 33.7  


 


Anion Gap 7  


 


Estimat Glomerular Filtration


Rate 78 


  


 


 


Total Creatine Kinase 85  


 


C-Reactive Protein LESS THAN 0.29  


 


Urine Color  LIGHT-YELLOW 


 


Urine Turbidity  CLEAR 


 


Urine pH  7.0 


 


Urine Specific Gravity  1.003 


 


Urine Protein  NEG 


 


Urine Glucose (UA)  NEG 


 


Urine Ketones  NEG 


 


Urine Occult Blood  NEG 


 


Urine Nitrite  NEG 


 


Urine Bilirubin  NEG 


 


Urine Urobilinogen  LESS THAN 2.0 


 


Urine Leukocyte Esterase  NEG 


 


Urine WBC  LESS THAN 1 


 


Microscopic Urinalysis Comment  CATH 








Result Diagram:  


18 173








Caprini VTE Risk Assessment


Caprini VTE Risk Assessment:  Mod/High Risk (score >= 2)


Caprini Risk Assessment Model











 Point Value = 1          Point Value = 2  Point Value = 3  Point Value = 5


 


Age 41-60


Minor surgery


BMI > 25 kg/m2


Swollen legs


Varicose veins


Pregnancy or postpartum


History of unexplained or recurrent


   spontaneous 


Oral contraceptives or hormone


   replacement


Sepsis (< 1 month)


Serious lung disease, including


   pneumonia (< 1 month)


Abnormal pulmonary function


Acute myocardial infarction


Congestive heart failure (< 1 month)


History of inflammatory bowel disease


Medical patient at bed rest Age 61-74


Arthroscopic surgery


Major open surgery (> 45 min)


Laparoscopic surgery (> 45 min)


Malignancy


Confined to bed (> 72 hours)


Immobilizing plaster cast


Central venous access Age >= 75


History of VTE


Family history of VTE


Factor V Leiden


Prothrombin 52411C


Lupus anticoagulant


Anticardiolipin antibodies


Elevated serum homocysteine


Heparin-induced thrombocytopenia


Other congenital or acquired


   thrombophilia Stroke (< 1 month)


Elective arthroplasty


Hip, pelvis, or leg fracture


Acute spinal cord injury (< 1 month)








Prophylaxis Regimen











   Total Risk


Factor Score Risk Level Prophylaxis Regimen


 


0-1      Low Early ambulation


 


2 Moderate Order ONE of the following:


*Sequential Compression Device (SCD)


*Heparin 5000 units SQ BID


 


3-4 Higher Order ONE of the following medications:


*Heparin 5000 units SQ TID


*Enoxaparin/Lovenox 40 mg SQ daily (WT < 150 kg, CrCl > 30 mL/min)


*Enoxaparin/Lovenox 30 mg SQ daily (WT < 150 kg, CrCl > 10-29 mL/min)


*Enoxaparin/Lovenox 30 mg SQ BID (WT < 150 kg, CrCl > 30 mL/min)


AND/OR


*Sequential Compression Device (SCD)


 


5 or more Highest Order ONE of the following medications:


*Heparin 5000 units SQ TID (Preferred with Epidurals)


*Enoxaparin/Lovenox 40 mg SQ daily (WT < 150 kg, CrCl > 30 mL/min)


*Enoxaparin/Lovenox 30 mg SQ daily (WT < 150 kg, CrCl > 10-29 mL/min)


*Enoxaparin/Lovenox 30 mg SQ BID (WT < 150 kg, CrCl > 30 mL/min)


AND


*Sequential Compression Device (SCD)











Assessment and Plan


Assessment and Plan


Assessment/plan:





1.  Double vision/ptosis


Unclear etiology


Symptoms and imaging consistent with Bell's palsy


Patient reports seeing the eye doctor today who states that "something is wrong 

with her brain."


Brain MRI/MRA and neck MRA within normal limits


Neurology consulted, appreciate recommendations





2.  Diabetes mellitus


Continue home Levemir


Sliding-scale insulin


Monitor blood glucose





3.  Hypertension/hyperlipidemia/CAD


Continue home medications





4.  Hypothyroidism


Continue home Synthroid





5.  Neuropathy


Continue home gabapentin





FEN


N.p.o.


Electrolytes: Monitor and replete as needed


NS at 70 cc/hour


Clary Stafford MD 2018 01:30

## 2018-06-12 NOTE — PD.CONS
History of Present Illness


Service


Neurology


Consult Requested By


Medical, TIA


Primary Care Physician


Salvador Manning MD


History of Present Illness


72-year-old female  presents the emergency department for evaluation of double 

vision and right sided ptosis.  The patient reports that for the past 4 days 

she has had double vision.  Horizontal diplopia she states for the past 2 days 

she has had right eyelid drooping with right-sided facial paralysis.


Daughter states patient's been noncompliant diabetic medications her blood 

sugars have been running high and has been under moderate level of stress.


Patient complains of right-sided headache.  Her ESR CRP are normal.


Denies any fever or chills.  Vitals have been stable since arrival no 

leukocytosis no fever.


MRI brain no acute lesion no stroke.  MRA [place] [facility] [place] and carotids normal 

no significant vaso-occlusive disease no dissection.








Review of Systems


Except as stated in HPI:  all other systems reviewed are Neg





Past Family Social History


Past Medical History


diabetes mellitus, coronary artery disease, Graves' disease, hypertension, 

hyperlipidemia, neuropathy





Past Surgical History


Coronary artery disease


Hysterectomy


Back tumor removal


Appendectomy


Tonsillectomy


Bilateral knee arthroscopically


Right rotator cuff repair


Reported Medications





Reported Meds & Active Scripts


Active


Reported


Lasix (Furosemide) 40 Mg Tab 40 Mg PO DAILY


Levothyroxine (Levothyroxine Sodium) 137 Mcg Tab 137 Mcg PO DAILY


Baclofen 20 Mg Tab 20 Mg PO TID


Aspirin 325 Mg Tab 325 Mg PO DAILY


Gabapentin 300 Mg Cap 300 Mg PO TID


Metoprolol Tartrate 25 Mg Tab 25 Mg PO BID


Omeprazole 20 Mg Tab 20 Mg PO DAILY


Novolin R Inj (Insulin Human Regular) 1,000 Unit/10 Ml Vial 0 SQ AS DIRECTED


     Sliding Scale As Directed.


Dyrenium (Triamterene) 50 Mg Cap 50 Mg PO DAILY


Hydrochlorothiazide 50 Mg Tab 75 Mg PO DAILY


Simvastatin 40 Mg Tab 40 Mg PO HS


Klor-Con 10 (Potassium Chloride) 10 Meq Tab 10 Meq PO BID


Effexor (Venlafaxine HCl) 75 Mg Tab 150 Mg PO Q12H


Levemir Flextouch Pen Inj (Insulin Detemir) 300 unit/3 ML Pen 60 Units SQ BID


Allergies:  


Coded Allergies:  


     codeine (Unverified  Allergy, Severe, RASH, 6/11/18)


Family History


Mother with diabetes mellitus.  Father with coronary artery disease.


Social History


Negative for alcohol, tobacco and illicit drugs





Review of Systems


All other ROS:  ROS reviewed as documented in chart





Past Family Social History


Allergies:  


Coded Allergies:  


     codeine (Unverified  Allergy, Severe, RASH, 6/11/18)


Active Ordered Medications





Current Medications








 Medications


  (Trade)  Dose


 Ordered  Sig/Vera


 Route  Start Time


 Stop Time Status Last Admin


 


  (NS Flush)  2 ml  UNSCH  PRN


 IV FLUSH  6/12/18 00:00


     


 


 


  (NS Flush)  2 ml  BID


 IV FLUSH  6/12/18 09:00


     


 


 


  (Lovenox Inj)  40 mg  Q24H


 SQ  6/12/18 00:00


    6/12/18 00:16


 


 


  (Narcan Inj)  0.4 mg  UNSCH  PRN


 IV PUSH  6/12/18 00:00


     


 


 


  (D50w (Vial) Inj)  50 ml  UNSCH  PRN


 IV PUSH  6/12/18 00:00


     


 


 


  (Glucagon Inj)  1 mg  UNSCH  PRN


 OTHER  6/12/18 00:00


     


 


 


  (NovoLOG


 SUPPLEMENTAL


 SCALE)  1  ACHS SLIDING  SCALE


 SQ  6/12/18 08:00


    6/12/18 13:25


 


 


  (Aspirin)  325 mg  DAILY


 PO  6/12/18 09:00


    6/12/18 08:34


 


 


  (Lasix)  40 mg  DAILY


 PO  6/12/18 09:00


    6/12/18 08:34


 


 


  (Neurontin)  300 mg  TID


 PO  6/12/18 09:00


    6/12/18 13:25


 


 


  (Hydrodiuril)  75 mg  DAILY


 PO  6/12/18 09:00


    6/12/18 08:34


 


 


  (Lopressor)  25 mg  BID


 PO  6/12/18 09:00


     


 


 


  (Levemir Inj)  60 units  BID


 SQ  6/12/18 09:00


    6/12/18 11:07


 


 


  (Synthroid)  112 mcg  DAILY@0700


 PO  6/12/18 07:00


    6/12/18 06:28


 


 


  (Protonix)  20 mg  DAILY


 PO  6/12/18 09:00


    6/12/18 08:36


 


 


  (Pravachol)  80 mg  HS


 PO  6/12/18 21:00


     


 


 


 Patient Own


 Medication  PT OWN


 MED:


 DYREN...  DAILY


 PO  6/12/18 09:00


     


 


 


  (Effexor Xr)  300 mg  DAILY


 PO  6/12/18 09:00


    6/12/18 08:34


 


 


  (Synthroid)  25 mcg  DAILY@0700


 PO  6/12/18 07:00


    6/12/18 06:28


 


 


  (Tylenol)  650 mg  Q4H  PRN


 PO  6/12/18 08:30


    6/12/18 11:07


 


 


  (Norco  Mg)  1 tab  Q6H  PRN


 PO  6/12/18 16:00


   UNV  


 











Exam


I&O / VS











 6/12/18 6/12/18 6/13/18





 15:00 23:00 07:00


 


Intake Total  1000 ml 


 


Balance  1000 ml 


 


   


 


Intake IV Total  1000 ml 


 


# Voids 1  


 


# Bowel Movements 1  








Vital Signs








  Date Time  Temp Pulse Resp B/P (MAP) Pulse Ox O2 Delivery O2 Flow Rate FiO2


 


6/12/18 15:15  73      


 


6/12/18 12:27  70      


 


6/12/18 12:00 98.0 69 18 118/80 (93) 96   


 


6/12/18 08:00 98.0 65 18 137/62 (87) 94   


 


6/12/18 07:00  64      


 


6/12/18 06:03 98.5 72 15 126/63 (84) 94   


 


6/12/18 03:23 97.9 72 17 146/68 (94) 96   


 


6/12/18 03:11        


 


6/12/18 01:30  75 16 136/60 (85) 98 Room Air  


 


6/11/18 18:10  85 15 186/77 (113) 94 Room Air  


 


6/11/18 18:03     (143)  Room Air  


 


6/11/18 17:21 98.5 83 12 176/126 (143) 95 Room Air  








General:  Alert and Oriented, No acute distress


Eye:  EOMI


Respiratory:  Non-labored respirations


Neurologic:  Alert, Oriented, Normal DTR's


Psychiatric:  Cooperative, Appropriate mood & affect


Exam Comments


Pleasant 72-year-old female lying in bed awake alert oriented 3 no aphasia.   

mild right ptosis and right medial rectus paresis inability to abduct the right 

eye.  No periorbital edema or conjunctival injection or hemorrhage, OU 3.5-3 mm 

bilateral, facial sensation intact she is able to look to the right up and 

down.  Slight tenderness in the right temporal region no nuchal rigidity 

strength appropriate no pronator drift.  Reduce pinprick light touch in a 

stocking distribution no clonus plantarflex response gait not assessed 

secondary fall risk





Review/Management


Diagnosis/Plan:  


(1) Diabetic oculomotor palsy


ICD Codes:  E11.39 - Type 2 diabetes mellitus with other diabetic ophthalmic 

complication; H49.00 - Third [oculomotor] nerve palsy, unspecified eye


Status:  Acute


Plan:  Right 3rd nerve involvement which appears to be peripheral.  Weakness 

with right medial rectus and right ptosis.  Probable ischemic neuropathy 

neuropathy to 3rd nerve


Other considerations include Manuela-Hunt syndrome, myasthenia gravis although 

would present this acutely and with this much pain, thyroid orbital disease





Recommendations


Trial IV steroids


Pain control


Patch


OT eval


Thyroid studies


HbA1c


Compliance of medication diabetic control


Follow exam





(2) Diabetic peripheral neuropathy


ICD Codes:  E11.42 - Type 2 diabetes mellitus with diabetic polyneuropathy


Status:  Chronic











Joey Samaniego MD Jun 12, 2018 16:24

## 2018-06-13 VITALS
SYSTOLIC BLOOD PRESSURE: 133 MMHG | RESPIRATION RATE: 18 BRPM | HEART RATE: 81 BPM | OXYGEN SATURATION: 96 % | DIASTOLIC BLOOD PRESSURE: 59 MMHG | TEMPERATURE: 97.9 F

## 2018-06-13 VITALS — HEART RATE: 75 BPM

## 2018-06-13 VITALS
TEMPERATURE: 97.8 F | OXYGEN SATURATION: 96 % | SYSTOLIC BLOOD PRESSURE: 138 MMHG | RESPIRATION RATE: 18 BRPM | DIASTOLIC BLOOD PRESSURE: 75 MMHG | HEART RATE: 82 BPM

## 2018-06-13 VITALS
DIASTOLIC BLOOD PRESSURE: 60 MMHG | SYSTOLIC BLOOD PRESSURE: 137 MMHG | TEMPERATURE: 98 F | HEART RATE: 77 BPM | RESPIRATION RATE: 16 BRPM | OXYGEN SATURATION: 95 %

## 2018-06-13 VITALS — HEART RATE: 89 BPM

## 2018-06-13 VITALS — HEART RATE: 82 BPM

## 2018-06-13 RX ADMIN — INSULIN ASPART SCH: 100 INJECTION, SOLUTION INTRAVENOUS; SUBCUTANEOUS at 12:00

## 2018-06-13 RX ADMIN — GABAPENTIN SCH MG: 300 CAPSULE ORAL at 08:52

## 2018-06-13 RX ADMIN — HYDROCHLOROTHIAZIDE SCH MG: 50 TABLET ORAL at 08:52

## 2018-06-13 RX ADMIN — LEVOTHYROXINE SODIUM SCH MCG: 112 TABLET ORAL at 06:20

## 2018-06-13 RX ADMIN — LEVOTHYROXINE SODIUM SCH MCG: 25 TABLET ORAL at 06:20

## 2018-06-13 RX ADMIN — Medication SCH ML: at 10:19

## 2018-06-13 RX ADMIN — FUROSEMIDE SCH MG: 40 TABLET ORAL at 08:52

## 2018-06-13 RX ADMIN — HYDROCODONE BITARTRATE AND ACETAMINOPHEN PRN TAB: 10; 325 TABLET ORAL at 10:19

## 2018-06-13 RX ADMIN — GABAPENTIN SCH MG: 300 CAPSULE ORAL at 13:00

## 2018-06-13 RX ADMIN — ACYCLOVIR SCH UNITS: 800 TABLET ORAL at 09:18

## 2018-06-13 RX ADMIN — INSULIN ASPART SCH: 100 INJECTION, SOLUTION INTRAVENOUS; SUBCUTANEOUS at 09:18

## 2018-06-13 RX ADMIN — ASPIRIN SCH MG: 325 TABLET ORAL at 08:52

## 2018-06-13 RX ADMIN — ENOXAPARIN SODIUM SCH MG: 40 INJECTION SUBCUTANEOUS at 00:11

## 2018-06-13 RX ADMIN — PANTOPRAZOLE SODIUM SCH MG: 20 TABLET, DELAYED RELEASE ORAL at 08:51

## 2018-06-13 RX ADMIN — VENLAFAXINE HYDROCHLORIDE SCH MG: 75 CAPSULE, EXTENDED RELEASE ORAL at 08:51

## 2018-06-13 RX ADMIN — METOPROLOL TARTRATE SCH MG: 25 TABLET, FILM COATED ORAL at 08:51

## 2018-06-13 NOTE — HHI.PR
Subjective


Remarks





Patient reports that pain continues unchanged.  Denies any chest pain or 

shortness of breath.  Denies nausea vomiting.  Says she is okay going home





Objective





Vital Signs








  Date Time  Temp Pulse Resp B/P (MAP) Pulse Ox O2 Delivery O2 Flow Rate FiO2


 


6/13/18 08:12 97.9 81 18 133/59 (83) 96   


 


6/13/18 08:00  89      


 


6/13/18 04:23 98.0 77 16 137/60 (85) 95   


 


6/13/18 03:45  75      


 


6/13/18 00:00  82      


 


6/12/18 23:21 98.7 82 16 146/65 (92) 92   


 


6/12/18 20:00  80      


 


6/12/18 19:44 98.6 77 16 150/65 (93) 98   


 


6/12/18 16:00 98.2 69 20 154/67 (96) 96   


 


6/12/18 15:15  73      














I/O      


 


 6/12/18 6/12/18 6/12/18 6/13/18 6/13/18 6/13/18





 07:00 15:00 23:00 07:00 15:00 23:00


 


Intake Total   2200 ml 480 ml 500 ml 


 


Balance   2200 ml 480 ml 500 ml 


 


      


 


Intake Oral   1200 ml 480 ml 500 ml 


 


IV Total   1000 ml   


 


# Voids  1 3 2  


 


# Bowel Movements  1    








Result Diagram:  


6/12/18 0658                                                                   

             6/12/18 0658





Objective Remarks


GENERAL: Patient sitting up on edge of bed.  Appears comfortable.  Continued 

right eye ptosis as before.


SKIN: Warm and dry.


HEAD: Normocephalic.


EYES: No scleral icterus. No injection or drainage. 


NECK: Supple, trachea midline. No JVD.


CARDIOVASCULAR: Regular rate and rhythm without murmurs, gallops, or rubs. 


RESPIRATORY: Breath sounds equal bilaterally. No accessory muscle use.


GASTROINTESTINAL: Abdomen soft, non-tender, nondistended. 


MUSCULOSKELETAL: No cyanosis, or edema. 


BACK: Nontender without obvious deformity. No CVA tenderness.








A/P


Assessment and Plan


// Double vision/ptosis


Unclear etiology


Symptoms and imaging consistent with Bell's palsy


Patient reports seeing the eye doctor today who states that "something is wrong 

with her brain."


Brain MRI/MRA and neck MRA within normal limits


Neurology consulted, appreciate recommendations


= Appreciate neurology assistance.  No significant improvement with steroids.  

Will discontinue.  Will need to improve diabetic control.  Expect this is 

diabetic radiculopathy





//Diabetes mellitus


Continue home Levemir


Sliding-scale insulin


Monitor blood glucose


= Patient says that she was not using her insulin due to not being able to 

afford it and she does have insurance.  She does say however that she got her 

medication several days ago and has about a month left.  She says she will be 

compliant with medications, will call her primary care doctor if sugars 

consistently above 200.





//Hypertension/hyperlipidemia/CAD


Continue home medications





//  Hypothyroidism


Continue home Synthroid





// Neuropathy


Continue home gabapentin





FEN


N.p.o.


Electrolytes: Monitor and replete as needed


Lovenox


Discharge Planning


Discharge home in good condition.











Andrew Finn MD Jun 13, 2018 13:51

## 2018-06-13 NOTE — HHI.PR
Review/Management


Diagnosis/Plan:  


(1) Diabetic oculomotor palsy


ICD Codes:  E11.39 - Type 2 diabetes mellitus with other diabetic ophthalmic 

complication; H49.00 - Third [oculomotor] nerve palsy, unspecified eye


Status:  Acute


Plan:  Right 3rd nerve involvement which appears to be peripheral.  Weakness 

with right medial rectus and right ptosis.  Probable ischemic neuropathy 

neuropathy to 3rd nerve


Other considerations include Manuela-Hunt syndrome, myasthenia gravis although 

would not present this acutely and with this much pain, thyroid orbital disease





Recommendations


HbA1c greater than 11


B12 TSH normal


Eye patch for right I discussed with RN


Can be discharged from neurologic standpoint followed up in the outpatient 

setting in 2 weeks time


Symptoms should gradually improve certainly needs better glucose control





(2) Diabetic peripheral neuropathy


ICD Codes:  E11.42 - Type 2 diabetes mellitus with diabetic polyneuropathy


Status:  Chronic





Subjective


Subjective Comments


No acute events reported


 headache better


No chest pain


No dyspnea


Active Medications





Current Medications








 Medications


  (Trade)  Dose


 Ordered  Sig/Vera


 Route  Start Time


 Stop Time Status Last Admin


 


  (NS Flush)  2 ml  UNSCH  PRN


 IV FLUSH  6/12/18 00:00


     


 


 


  (NS Flush)  2 ml  BID


 IV FLUSH  6/12/18 09:00


    6/12/18 22:17


 


 


  (Lovenox Inj)  40 mg  Q24H


 SQ  6/12/18 00:00


    6/13/18 00:11


 


 


  (Narcan Inj)  0.4 mg  UNSCH  PRN


 IV PUSH  6/12/18 00:00


     


 


 


  (D50w (Vial) Inj)  50 ml  UNSCH  PRN


 IV PUSH  6/12/18 00:00


     


 


 


  (Glucagon Inj)  1 mg  UNSCH  PRN


 OTHER  6/12/18 00:00


     


 


 


  (NovoLOG


 SUPPLEMENTAL


 SCALE)  1  ACHS SLIDING  SCALE


 SQ  6/12/18 08:00


    6/13/18 09:18


 


 


  (Aspirin)  325 mg  DAILY


 PO  6/12/18 09:00


    6/13/18 08:52


 


 


  (Lasix)  40 mg  DAILY


 PO  6/12/18 09:00


    6/13/18 08:52


 


 


  (Neurontin)  300 mg  TID


 PO  6/12/18 09:00


    6/13/18 08:52


 


 


  (Hydrodiuril)  75 mg  DAILY


 PO  6/12/18 09:00


    6/13/18 08:52


 


 


  (Lopressor)  25 mg  BID


 PO  6/12/18 09:00


    6/13/18 08:51


 


 


  (Levemir Inj)  60 units  BID


 SQ  6/12/18 09:00


    6/13/18 09:18


 


 


  (Synthroid)  112 mcg  DAILY@0700


 PO  6/12/18 07:00


    6/13/18 06:20


 


 


  (Protonix)  20 mg  DAILY


 PO  6/12/18 09:00


    6/13/18 08:51


 


 


  (Pravachol)  80 mg  HS


 PO  6/12/18 21:00


    6/12/18 22:18


 


 


 Patient Own


 Medication  PT OWN


 MED:


 DYREN...  DAILY


 PO  6/12/18 09:00


     


 


 


  (Effexor Xr)  300 mg  DAILY


 PO  6/12/18 09:00


    6/13/18 08:51


 


 


  (Synthroid)  25 mcg  DAILY@0700


 PO  6/12/18 07:00


    6/13/18 06:20


 


 


  (Tylenol)  650 mg  Q4H  PRN


 PO  6/12/18 08:30


    6/12/18 11:07


 


 


  (Norco  Mg)  1 tab  Q6H  PRN


 PO  6/12/18 16:00


    6/12/18 22:19


 


 


  (SoluMEDROL INJ)  125 mg  Q8HR


 IV  6/12/18 16:30


 6/14/18 16:00  6/13/18 06:20


 








Allergies





Allergies


Coded Allergies


  codeine (Unverified Allergy, Severe, RASH, 6/11/18)





Review of Systems


All other ROS:  ROS reviewed as documented in chart





Exam


I&O / VS





Vital Signs








  Date Time  Temp Pulse Resp B/P (MAP) Pulse Ox O2 Delivery O2 Flow Rate FiO2


 


6/13/18 08:12 97.9 81 18 133/59 (83) 96   


 


6/13/18 04:23 98.0 77 16 137/60 (85) 95   


 


6/13/18 03:45  75      


 


6/13/18 00:00  82      


 


6/12/18 23:21 98.7 82 16 146/65 (92) 92   


 


6/12/18 20:00  80      


 


6/12/18 19:44 98.6 77 16 150/65 (93) 98   


 


6/12/18 16:00 98.2 69 20 154/67 (96) 96   


 


6/12/18 15:15  73      


 


6/12/18 12:27  70      


 


6/12/18 12:00 98.0 69 18 118/80 (93) 96   








General:  Alert and Oriented, No acute distress


Eye:  EOMI


Respiratory:  Non-labored respirations


Neurologic:  Alert, Oriented, Normal DTR's


Psychiatric:  Cooperative, Appropriate mood & affect


Exam Comments


alert oriented 3 no aphasia.   mild right ptosis and right medial rectus 

paresis inability to abduct the right eye.  No periorbital edema or 

conjunctival injection or hemorrhage, OU 3.5-3 mm bilateral, facial sensation 

intact she is able to look to the right up and down.  No temporal tenderness no 

nuchal rigidity strength appropriate no pronator drift.  Reduce pinprick light 

touch in a stocking distribution no clonus plantarflex response gait not 

assessed secondary fall risk





Objective


Micro and Labs





Laboratory Tests








Test


  6/12/18


18:26


 


Hemoglobin A1c 11.8 


 


C-Reactive Protein LESS THAN 0.29 


 


Vitamin B12 Level 383 


 


Thyroid Stimulating Hormone


3rd Gen 1.070 


 

















Joey Samaniego MD Jun 13, 2018 09:25

## 2018-06-13 NOTE — HHI.DS
__________________________________________________





Discharge Summary


Admission Date


Jun 11, 2018 at 23:17


Discharge Date:  Jun 13, 2018


Admitting Diagnosis





R facial drooping x 4 days, worsening last night, r/o CVA





(1) Diabetic oculomotor palsy


ICD Code:  E11.39 - Type 2 diabetes mellitus with other diabetic ophthalmic 

complication; H49.00 - Third [oculomotor] nerve palsy, unspecified eye


Status:  Acute


(2) Diabetic peripheral neuropathy


ICD Code:  E11.42 - Type 2 diabetes mellitus with diabetic polyneuropathy


Status:  Chronic


(3) Facial palsy


ICD Code:  G51.0 - Bell's palsy


Status:  Acute


Procedures


No invasive procedures.


Brief History - From Admission


72-year-old female with a past medical history significant for diabetes mellitus

, coronary artery disease, Graves' disease, hypertension, hyperlipidemia, 

neuropathy and history of CVA 5 presents the emergency department for 

evaluation of double vision and right sided ptosis.  The patient reports that 

for the past 4 days she has had double vision.  She states for the past 2 days 

she has had right eyelid drooping with right-sided facial paralysis.  The 

patient was seen in [place] yesterday where she was diagnosed with a facial 

palsy.  Patient denies any chest pain or shortness of breath.  No abdominal 

pain.  No nausea/vomiting/diarrhea.  No fevers/chills.


CBC/BMP:  


6/12/18 0658                                                                   

             6/12/18 0658





Significant Findings





Laboratory Tests








Test


  6/11/18


17:37 6/11/18


17:40 6/12/18


06:58 6/12/18


18:26


 


Hemoglobin


  15.5 GM/DL


(11.6-15.3) 


  


  


 


 


Prothrombin Time


  9.3 SEC


(9.8-11.6) 


  


  


 


 


Activated Partial


Thromboplast Time 22.1 SEC


(24.3-30.1) 


  


  


 


 


Random Glucose


  167 MG/DL


() 


  207 MG/DL


() 


 


 


Alkaline Phosphatase


  173 U/L


() 


  


  


 


 


Carbon Dioxide Level


  33.7 MEQ/L


(21.0-32.0) 


  32.1 MEQ/L


(21.0-32.0) 


 


 


Estimat Glomerular Filtration


Rate 78 ML/MIN


(>89) 


  


  


 


 


Monocytes (%) (Auto)


  


  


  8.3 %


(0.0-8.0) 


 


 


Eosinophils (%) (Auto)


  


  


  5.6 %


(0.0-4.0) 


 


 


Hemoglobin A1c


  


  


  


  11.8 %


(4.3-6.0)








Imaging





Last Impressions








Neck Magnetic Resonance Angiography 6/11/18 0000 Signed





Impressions: 





 CONCLUSION: 





 1.  Unremarkable study.





  





 





 _____________________________________________________________________________





 Percent stenosis is calculated using the diameter of the stenotic region over t





 he diameter of the normal distal internal carotid artery





 _____________________________________________________________________________





  





 





  





 


 


Head Magnetic Resonance Angiography 6/11/18 0000 Signed





Impressions: 





 CONCLUSION: 





 1.  Negative MRA [place] ([place] [facility] [place]) non contrast.





  





 





  





 


 


Brain MRI 6/11/18 0000 Signed





Impressions: 





 CONCLUSION:  Unremarkable study.  





  





 








Hospital Course


Patient was found to have right eye ptosis on admission.  Brain imaging 

negative as above.  ESR only 17.  Neurology was consulted.  Allergy feels that 

this is likely a diabetic oculomotor palsy.  Patient was tried on IV steroids 

without significant improvement.  Patient will need to wear a patch over right 

eye.  Advised strongly against driving.  Patient will be discharged home to 

follow-up with primary care, neurology as outpatient.  Stressed compliance with 

insulin.








 A1c elevated at 11.8.  She reports poor diabetic control at home secondary to 

not being able to afford her medications, however she has recently picked up 

medication and has a month left.  Stressed compliance with patient.  She 

conveys understanding.





For problem based summary from most recent progress note, please see below.








// Double vision/ptosis


Unclear etiology


Symptoms and imaging consistent with Bell's palsy


Patient reports seeing the eye doctor today who states that "something is wrong 

with her brain."


Brain MRI/MRA and neck MRA within normal limits


Neurology consulted, appreciate recommendations


= Appreciate neurology assistance.  No significant improvement with steroids.  

Will discontinue.  Will need to improve diabetic control.  Expect this is 

diabetic radiculopathy





//Diabetes mellitus


Continue home Levemir


Sliding-scale insulin


Monitor blood glucose


= Patient says that she was not using her insulin due to not being able to 

afford it and she does have insurance.  She does say however that she got her 

medication several days ago and has about a month left.  She says she will be 

compliant with medications, will call her primary care doctor if sugars 

consistently above 200.





//Hypertension/hyperlipidemia/CAD


Continue home medications





//  Hypothyroidism


Continue home Synthroid





// Neuropathy


Continue home gabapentin





FEN


N.p.o.


Electrolytes: Monitor and replete as needed


Lovenox


Pt Condition on Discharge:  Good


Discharge Disposition:  Discharge Home


Discharge Time:  > 30 minutes


Discharge Instructions


DIET: Follow Instructions for:  As Tolerated, No Restrictions


Activities you can perform:  Regular-No Restrictions


Activities to Avoid:  Driving


Follow up Referrals:  


Neurology - 1 Week


PCP Follow-up - 1 Week with Salvador Manning MD





Continued Medications:  


Aspirin (Aspirin) 325 Mg Tab


325 MG PO DAILY, #30 TAB 0 Refills





Baclofen (Baclofen) 20 Mg Tab


20 MG PO TID for Muscle Spasm, TAB 0 Refills





Furosemide (Lasix) 40 Mg Tab


40 MG PO DAILY, #30 TAB 0 Refills





Gabapentin (Gabapentin) 300 Mg Cap


300 MG PO TID, #90 CAP 0 Refills





Hydrochlorothiazide (Hydrochlorothiazide) 50 Mg Tab


75 MG PO DAILY, #60 TAB 0 Refills





Hydrocodone-Acetaminophen (Hydrocodone-Acetaminophen)  mg Tab


1 TAB PO Q6H PRN for PAIN, TAB 0 Refills





Insulin Detemir Inj (Levemir Flextouch Pen Inj) 300 unit/3 ML Pen


60 UNITS SQ BID for Blood Sugar Management, PEN 0 Refills





Insulin Human Regular Inj (Novolin R Inj) 1,000 Unit/10 Ml Vial


0 SQ AS DIRECTED for Blood Sugar Management, #10 ML 0 Refills


Sliding Scale As Directed.


Levothyroxine (Levothyroxine) 137 Mcg Tab


137 MCG PO DAILY for Thyroid, #30 TAB 0 Refills





Metoprolol Tartrate (Metoprolol Tartrate) 25 Mg Tab


25 MG PO BID, #60 TAB 0 Refills





Omeprazole (Omeprazole) 20 Mg Tab


20 MG PO DAILY, #30 TAB 0 Refills





Potassium Chloride ER (Klor-Con 10) 10 Meq Tab


10 MEQ PO BID for Electrolyte Replacement, #60 TAB 0 Refills





Simvastatin (Simvastatin) 40 Mg Tab


40 MG PO HS for Cholesterol Management, #30 TAB 0 Refills





Triamterene (Dyrenium) 50 Mg Cap


50 MG PO DAILY for Edema, #30 CAP 0 Refills





Venlafaxine (Effexor) 75 Mg Tab


150 MG PO Q12H, #120 TAB 0 Refills

















Andrew Finn MD Jun 13, 2018 13:54